# Patient Record
Sex: FEMALE | Race: WHITE | NOT HISPANIC OR LATINO | Employment: OTHER | URBAN - NONMETROPOLITAN AREA
[De-identification: names, ages, dates, MRNs, and addresses within clinical notes are randomized per-mention and may not be internally consistent; named-entity substitution may affect disease eponyms.]

---

## 2018-08-02 ENCOUNTER — OFFICE VISIT (OUTPATIENT)
Dept: NEUROSURGERY | Facility: CLINIC | Age: 83
End: 2018-08-02

## 2018-08-02 VITALS
SYSTOLIC BLOOD PRESSURE: 162 MMHG | HEIGHT: 64 IN | BODY MASS INDEX: 33.87 KG/M2 | WEIGHT: 198.4 LBS | DIASTOLIC BLOOD PRESSURE: 84 MMHG

## 2018-08-02 DIAGNOSIS — Z78.9 NON-SMOKER: ICD-10-CM

## 2018-08-02 DIAGNOSIS — M48.061 SPINAL STENOSIS, LUMBAR REGION, WITHOUT NEUROGENIC CLAUDICATION: Primary | ICD-10-CM

## 2018-08-02 PROCEDURE — 99203 OFFICE O/P NEW LOW 30 MIN: CPT | Performed by: NURSE PRACTITIONER

## 2018-08-02 RX ORDER — NICOTINE POLACRILEX 4 MG/1
1 GUM, CHEWING ORAL
COMMUNITY
End: 2021-05-03

## 2018-08-02 RX ORDER — ZINC GLUCONATE 50 MG
50 TABLET ORAL
COMMUNITY

## 2018-08-02 RX ORDER — AMLODIPINE BESYLATE 5 MG/1
5 TABLET ORAL
COMMUNITY
Start: 2017-05-08

## 2018-08-02 RX ORDER — DOCUSATE SODIUM 100 MG/1
100 CAPSULE, LIQUID FILLED ORAL
COMMUNITY

## 2018-08-02 RX ORDER — LOSARTAN POTASSIUM 25 MG/1
25 TABLET ORAL
COMMUNITY

## 2018-08-02 NOTE — PROGRESS NOTES
Chief complaint:   Chief Complaint   Patient presents with   • Back Pain     Yaneth is referred here today for her back, this pain goes down her right leg.  She brings with her today an MRI of the lumbar from [facility].  She tried physical therapy, had to discontinue it yesterday, she couldn't handle it.        Subjective     HPI: This is an 83-year-old female patient who was referred to us by Dr. Dov Saunders for back and leg pain.  She is here to be evaluated today.  Patient had surgery in her back which consisted of a laminectomy and L4 through S1 posterior pedicle screw and she mentation for spondylolisthesis and stenosis.  She says that she did do well after that surgery.  About 6 months ago she did sustain a fall and says that since then she is been noticing some increased pain but 6 weeks ago did get significantly worse.  She states that the pain in her back is intermittent.  Its worse with certain positioning and better with certain positioning.  She has pain that radiates down into her right lower extremity in a lateral radicular fashion down to about her knee.  This pain is intermittent as well.  Again its worse with certain positioning and worse with certain positioning.  Denies any bowel or bladder incontinence.  She has tried physical therapy without any meaningful relief.  She has not any recent chiropractic care pain management injections.  She rates the pain on scale 0-10 out of 10 when it does act up she says it can interfere with activities of daily living.  She is right-hand dominant.  She is retired.  She is .    Review of Systems   HENT: Positive for hearing loss and tinnitus.    Respiratory: Positive for apnea.    Cardiovascular: Positive for leg swelling.   Gastrointestinal: Positive for abdominal distention and constipation.   Endocrine: Positive for polyuria.   Genitourinary: Positive for urgency.   Musculoskeletal: Positive for arthralgias, back pain, gait problem,  "joint swelling, neck pain and neck stiffness.   Neurological: Positive for light-headedness, numbness and headaches.   Psychiatric/Behavioral: Positive for decreased concentration.   All other systems reviewed and are negative.       Past Medical History:   Diagnosis Date   • Arthritis    • Diabetes (CMS/HCC)    • Heart trouble    • Hypertension    • Stroke (CMS/HCC)     Mini stroke     Past Surgical History:   Procedure Laterality Date   • ANKLE SURGERY      fixation of left ankle   • BACK SURGERY     • EYE SURGERY      Lasar surgery both eyes   • HYSTERECTOMY     • REPLACEMENT TOTAL KNEE BILATERAL     • ROTATOR CUFF REPAIR Right      Family History   Problem Relation Age of Onset   • Heart disease Mother    • Parkinsonism Father    • Cancer Sister      Social History   Substance Use Topics   • Smoking status: Never Smoker   • Smokeless tobacco: Never Used   • Alcohol use No       (Not in a hospital admission)  Allergies:  Oxycodone hcl; Bacitracin; Neomycin; Penicillin g; and Polymyxin b    Objective      Vital Signs  /84 (BP Location: Right arm, Patient Position: Sitting)   Ht 162.6 cm (64\")   Wt 90 kg (198 lb 6.4 oz)   BMI 34.06 kg/m²     Physical Exam   Constitutional: She is oriented to person, place, and time. She appears well-developed and well-nourished.   HENT:   Head: Normocephalic.   Eyes: Pupils are equal, round, and reactive to light. Conjunctivae, EOM and lids are normal.   Neck: Normal range of motion.   Cardiovascular: Normal rate, regular rhythm and normal heart sounds.    Pulmonary/Chest: Effort normal and breath sounds normal.   Abdominal: Normal appearance.   Musculoskeletal: Normal range of motion.        Lumbar back: She exhibits pain.   Neurological: She is alert and oriented to person, place, and time. She has normal strength. She displays normal reflexes. No cranial nerve deficit or sensory deficit. GCS eye subscore is 4. GCS verbal subscore is 5. GCS motor subscore is 6.   Reflex " Scores:       Patellar reflexes are 3+ on the right side and 3+ on the left side.  Skin: Skin is warm.   Psychiatric: She has a normal mood and affect. Her speech is normal and behavior is normal. Thought content normal. Cognition and memory are normal.       Results Review: MRI of the lumbar spine shows the patient has had a posterior pedicle screw instrumentation at L4-5 with laminectomy.  She does have adjacent level degeneration present at L2-3 and L3-for is causing central and bilateral neural foraminal narrowing.  No fracture visualized.          Assessment/Plan: At this point we are going to recommend that the patient do a trial of pain management injections to see this will be of a benefit to her.  Dr. Montes did review the imaging and felt the patient would require an extension of her previous fusion at L2 and given the patient's age and the amount of surgery that it requires he felt that it would be better if she can try manages with injections.  The patient is agreeable to this.  She is getting go for second opinion back to her original surgeon in [place].  At this point she only was to be seen on an as-needed basis and told to call us if she had any further problems.  BMI shows that she is overweight.  BMI chart was given the patient.  She is a nonsmoker.       Starr was seen today for back pain.    Diagnoses and all orders for this visit:    Spinal stenosis, lumbar region, without neurogenic claudication    BMI 34.0-34.9,adult    Non-smoker          I discussed the patients findings and my recommendations with patient    Benjamin Feng, ROMEL  08/02/18  9:50 AM

## 2018-08-02 NOTE — PATIENT INSTRUCTIONS

## 2018-08-20 ENCOUNTER — TELEPHONE (OUTPATIENT)
Dept: NEUROSURGERY | Facility: CLINIC | Age: 83
End: 2018-08-20

## 2018-08-20 DIAGNOSIS — M48.061 SPINAL STENOSIS OF LUMBAR REGION, UNSPECIFIED WHETHER NEUROGENIC CLAUDICATION PRESENT: Primary | ICD-10-CM

## 2018-08-20 NOTE — TELEPHONE ENCOUNTER
Starr has called the office after a visit on 08/02/18, at that time she had decided to go back to her surgeon in [facility] for a 2nd opinion, after she saw him she states he was in agreement with Dr. Montes so she has decided to have all here treatment here in [place].  She was suppose to see a pain management in [facility] but they cannot see her for 2 months.     Starr is asking to treat here, she would like a referral to a local pain management and a office visit with Dr. Montes as soon as can be arranged, she states she is in so much pain and needs relief as soon as possible.    Please call Starr to advise.  Her # [phone]

## 2018-08-20 NOTE — TELEPHONE ENCOUNTER
She can be referred to Dr. echols for pain management and then give her an appointment with Dr. Montes for 3 months.  Thanks

## 2018-08-21 NOTE — TELEPHONE ENCOUNTER
I called patient back to let her know we will make a referral for her to Dr. Moreno for pain management and I also gave her an appointment with Dr. Montes for 11/01/18 for follow up.

## 2018-11-01 ENCOUNTER — OFFICE VISIT (OUTPATIENT)
Dept: NEUROSURGERY | Facility: CLINIC | Age: 83
End: 2018-11-01

## 2018-11-01 VITALS
BODY MASS INDEX: 35.34 KG/M2 | DIASTOLIC BLOOD PRESSURE: 74 MMHG | HEIGHT: 64 IN | WEIGHT: 207 LBS | SYSTOLIC BLOOD PRESSURE: 168 MMHG

## 2018-11-01 DIAGNOSIS — M48.061 SPINAL STENOSIS, LUMBAR REGION, WITHOUT NEUROGENIC CLAUDICATION: Primary | ICD-10-CM

## 2018-11-01 DIAGNOSIS — Z78.9 NON-SMOKER: ICD-10-CM

## 2018-11-01 PROCEDURE — 99213 OFFICE O/P EST LOW 20 MIN: CPT | Performed by: NEUROLOGICAL SURGERY

## 2018-11-01 RX ORDER — TIZANIDINE 4 MG/1
TABLET ORAL
COMMUNITY
Start: 2018-08-30

## 2018-11-01 RX ORDER — GABAPENTIN 100 MG/1
CAPSULE ORAL
COMMUNITY
Start: 2018-08-16 | End: 2021-05-03

## 2018-11-01 RX ORDER — ROSUVASTATIN CALCIUM 10 MG/1
TABLET, COATED ORAL
COMMUNITY
Start: 2018-10-03

## 2018-11-01 NOTE — PROGRESS NOTES
SUBJECTIVE:  Patient ID: Starr Chris is a 83 y.o. female is here today for follow-up.    Chief Complaint: Back pain  Chief Complaint   Patient presents with   • back & leg pain     patient is here for a 3 month follow up after pain mgmt; she went to see a physician in [facility] ([facility] physicians)       HPI  83-year-old female we have seen in the past for back pain and some right lower extremity radicular type pain.  She has 2 level adjacent level stenosis at L2-3 34 above a previous fusion.  We sent her for a dedicated course of pain management injections.  She has been working with [facility] rehabilitation.  She has had 2 injections.  The first one did help to some degree.  The second one was a week ago and seems to have further improved her pain control.    The following portions of the patient's history were reviewed and updated as appropriate: allergies, current medications, past family history, past medical history, past social history, past surgical history and problem list.    OBJECTIVE:    Review of Systems   Musculoskeletal: Positive for back pain.   All other systems reviewed and are negative.         Physical Exam   Constitutional: She is oriented to person, place, and time. She appears well-developed and well-nourished.   HENT:   Head: Normocephalic and atraumatic.   Right Ear: Hearing normal.   Left Ear: Hearing normal.   Eyes: Pupils are equal, round, and reactive to light. EOM are normal.   Neck: Normal range of motion.   Neurological: She is alert and oriented to person, place, and time. She has normal strength and normal reflexes. No cranial nerve deficit or sensory deficit. She displays a negative Romberg sign. GCS eye subscore is 4. GCS verbal subscore is 5. GCS motor subscore is 6. She displays no Babinski's sign on the right side. She displays no Babinski's sign on the left side.   Psychiatric: Her speech is normal. Judgment normal. Cognition and memory are normal.       Neurologic Exam      Mental Status   Oriented to person, place, and time.   Speech: speech is normal     Cranial Nerves     CN III, IV, VI   Pupils are equal, round, and reactive to light.  Extraocular motions are normal.     Motor Exam     Strength   Strength 5/5 throughout.       Independent Review of Radiographic Studies:   Severe concentric lumbar stenosis at L2-3 and 3-4 above a previous 2 level fusion.    ASSESSMENT/PLAN:  Miss Chris is doing well with conservative care right now.  She is just beginning of injection therapy.  I explained to her and her daughter that we would have to exhaust all conservative measures before we consider extending her fusion 2 levels at her age.  She apparently has had a second opinion and they agree with our assessment.  I be happy to see her in the future should the injections no longer bring her relief.      1. Spinal stenosis, lumbar region, without neurogenic claudication    2. Non-smoker    3. BMI 35.0-35.9,adult            Return if symptoms worsen or fail to improve.      Baltazar Montes MD

## 2018-11-01 NOTE — PATIENT INSTRUCTIONS
PATIENT TO CONTINUE TO FOLLOW UP WITH HER PRIMARY CARE PROVIDER FOR YEARLY PHYSICAL EXAMS TO ENSURE COMPLETE HEALTH MAINTENANCE      BMI for Adults  Body mass index (BMI) is a number that is calculated from a person's weight and height. In most adults, the number is used to find how much of an adult's weight is made up of fat. BMI is not as accurate as a direct measure of body fat.  How is BMI calculated?  BMI is calculated by dividing weight in kilograms by height in meters squared. It can also be calculated by dividing weight in pounds by height in inches squared, then multiplying the resulting number by 703. Charts are available to help you find your BMI quickly and easily without doing this calculation.  How is BMI interpreted?  Health care professionals use BMI charts to identify whether an adult is underweight, at a normal weight, or overweight based on the following guidelines:  · Underweight: BMI less than 18.5.  · Normal weight: BMI between 18.5 and 24.9.  · Overweight: BMI between 25 and 29.9.  · Obese: BMI of 30 and above.    BMI is usually interpreted the same for males and females.  Weight includes both fat and muscle, so someone with a muscular build, such as an athlete, may have a BMI that is higher than 24.9. In cases like these, BMI may not accurately depict body fat. To determine if excess body fat is the cause of a BMI of 25 or higher, further assessments may need to be done by a health care provider.  Why is BMI a useful tool?  BMI is used to identify a possible weight problem that may be related to a medical problem or may increase the risk for medical problems. BMI can also be used to promote changes to reach a healthy weight.  This information is not intended to replace advice given to you by your health care provider. Make sure you discuss any questions you have with your health care provider.  Document Released: 08/29/2005 Document Revised: 04/27/2017 Document Reviewed: 05/15/2015  Janelle  Interactive Patient Education © 2018 [employer]

## 2021-04-29 ENCOUNTER — TELEPHONE (OUTPATIENT)
Dept: NEUROSURGERY | Facility: CLINIC | Age: 86
End: 2021-04-29

## 2021-04-29 NOTE — TELEPHONE ENCOUNTER
Called to confirm apt for 05/03/21 @ 10:15 w/julius limon;  Left v/m with apt info & call back # to confirm

## 2021-05-03 ENCOUNTER — OFFICE VISIT (OUTPATIENT)
Dept: NEUROSURGERY | Facility: CLINIC | Age: 86
End: 2021-05-03

## 2021-05-03 VITALS
WEIGHT: 199.4 LBS | SYSTOLIC BLOOD PRESSURE: 134 MMHG | HEIGHT: 65 IN | BODY MASS INDEX: 33.22 KG/M2 | DIASTOLIC BLOOD PRESSURE: 78 MMHG

## 2021-05-03 DIAGNOSIS — Z78.9 NON-SMOKER: ICD-10-CM

## 2021-05-03 DIAGNOSIS — M48.061 SPINAL STENOSIS, LUMBAR REGION, WITHOUT NEUROGENIC CLAUDICATION: Primary | ICD-10-CM

## 2021-05-03 DIAGNOSIS — E66.1 CLASS 1 DRUG-INDUCED OBESITY WITHOUT SERIOUS COMORBIDITY WITH BODY MASS INDEX (BMI) OF 33.0 TO 33.9 IN ADULT: ICD-10-CM

## 2021-05-03 PROCEDURE — 99213 OFFICE O/P EST LOW 20 MIN: CPT | Performed by: NURSE PRACTITIONER

## 2021-05-03 RX ORDER — PANTOPRAZOLE SODIUM 20 MG/1
20 TABLET, DELAYED RELEASE ORAL DAILY
COMMUNITY

## 2021-05-03 NOTE — PROGRESS NOTES
Chief complaint:   Chief Complaint   Patient presents with   • Back Pain     Yaneth is returning to us with a CT scan of her lumbar for follow up for her low back pain, she bring the disc from [facility].  She saw Dr. Jyoti blevins in 2018 for back pain.  She is not presently in pain management but had done this in the past, she has not had any recent physical therapy.        Subjective     HPI: This is an 85-year-old female patient who was seen in the past for back pain and right lower extremity pain.  Patient has had a previous fusion from L4-S1.  The last time that we saw her she was was in November 2018 and she was working with pain management and was doing well from the injections.  She said that last year she was down quite a bit because she had shingles and is noticed over the last year that her back pain and right leg pain has started to bother her more.  She said the pain in her back is intermittent.  Is worse with standing and better with sitting or laying down.  She has pain that radiates into her right lower extremity in an anterior radicular fashion to her knee.  This pain is also intermittent has the same modifying factors as her back.  Denies any bowel or bladder incontinence.  She is not done any recent physical therapy or chiropractic care.  She is right-hand dominant.  She is retired.  Denies any tobacco, alcohol, or illicit drug use.  She states that she was working with a pain management physician in [facility] but that he has since moved.  She last saw him in January and has been to [place] with a different pain management provider but has not had any improvement with his injections.  She said that she was getting about 3 to 6 months of improvement with the injections.  Rates her pain on a scale 0-10 at a 7.  She says it does interfere with actives of daily living    Review of Systems   Constitutional: Positive for activity change.   Musculoskeletal: Positive for back pain.   All  "other systems reviewed and are negative.       Past Medical History:   Diagnosis Date   • Arthritis    • Diabetes (CMS/HCC)    • Heart trouble    • Hypertension    • Stroke (CMS/HCC)     Mini stroke     Past Surgical History:   Procedure Laterality Date   • ANKLE SURGERY      fixation of left ankle   • BACK SURGERY     • EYE SURGERY      Lasar surgery both eyes   • HYSTERECTOMY     • REPLACEMENT TOTAL KNEE BILATERAL     • ROTATOR CUFF REPAIR Right      Family History   Problem Relation Age of Onset   • Heart disease Mother    • Parkinsonism Father    • Cancer Sister      Social History     Tobacco Use   • Smoking status: Never Smoker   • Smokeless tobacco: Never Used   Substance Use Topics   • Alcohol use: No   • Drug use: No     (Not in a hospital admission)    Allergies:  Oxycodone hcl, Bacitracin, Neomycin, Penicillin g, and Polymyxin b    Objective      Vital Signs  /78   Ht 165.1 cm (65\")   Wt 90.4 kg (199 lb 6.4 oz)   BMI 33.18 kg/m²     Physical Exam  Constitutional:       Appearance: Normal appearance. She is well-developed.   HENT:      Head: Normocephalic.   Eyes:      General: Lids are normal.      Conjunctiva/sclera: Conjunctivae normal.      Pupils: Pupils are equal, round, and reactive to light.   Cardiovascular:      Rate and Rhythm: Normal rate and regular rhythm.   Pulmonary:      Effort: Pulmonary effort is normal.      Breath sounds: Normal breath sounds.   Musculoskeletal:         General: Normal range of motion.      Cervical back: Normal range of motion.      Comments: Back pain   Skin:     General: Skin is warm.   Neurological:      Mental Status: She is alert and oriented to person, place, and time.      GCS: GCS eye subscore is 4. GCS verbal subscore is 5. GCS motor subscore is 6.      Cranial Nerves: No cranial nerve deficit.      Sensory: No sensory deficit.      Deep Tendon Reflexes: Reflexes are normal and symmetric. Reflexes normal.   Psychiatric:         Speech: Speech normal. "         Behavior: Behavior normal.         Thought Content: Thought content normal.         Neurologic Exam     Mental Status   Oriented to person, place, and time.   Speech: speech is normal     Cranial Nerves     CN III, IV, VI   Pupils are equal, round, and reactive to light.      Results Review: CT scan of the lumbar spine that was done at [facility] on April 30, 2021 shows the patient is at a previous fusion from L4-S1.  Degeneration is noted at L2-3 and L3-4.  No significant stenosis that I can appreciate on the CAT scan.  No compression deformity.        Assessment/Plan: The patient would like to try different pain management doctor that is closer to her than [place].  We will make referral to Dr. AZEEM Malhotra to see if he can make any improvement with her pain with the injections.  She says that she is not taking any pain medication at this time  For first line conservative care of lumbar pain, I would like to send Ms. Chris for a dedicated course of physician directed physical therapy consisting of 2-3 times a week for 4-6 weeks.    Return for reassessment with me after 4 months after physical therapy.     Should Ms. Chris not have any improvement from physical therapy, I think it would be prudent to send the patient for an MRI of the lumbar spine to see if there is anything from a surgical standpoint that needs to be addressed.     I advised the patient to call and return sooner for new or worsening complaints of weakness, paresthesias, gait disturbances, or any additional concerns.  Treatment options discussed in detail with Starr and the patient voiced understanding.  Ms. Chris agrees with this plan of care.     Patient is a nonsmoker  The patient's Body mass index is 33.18 kg/m².. BMI is above normal parameters. Recommendations include: educational material and nutrition counseling  Advance Care Planning   ACP discussion was held with the patient during this visit. Patient does not have an advance  directive, information provided.      Diagnoses and all orders for this visit:    1. Spinal stenosis, lumbar region, without neurogenic claudication (Primary)  -     Ambulatory Referral to Physical Therapy Evaluate and treat, Neuro; Strengthening, Stretching; Full weight bearing  -     Ambulatory Referral to Pain Management    2. Class 1 drug-induced obesity without serious comorbidity with body mass index (BMI) of 33.0 to 33.9 in adult    3. Non-smoker          I discussed the patients findings and my recommendations with patient    Benjamin Feng, ROMEL  05/03/21  11:05 CDT

## 2021-05-03 NOTE — PATIENT INSTRUCTIONS

## 2021-09-09 ENCOUNTER — TELEPHONE (OUTPATIENT)
Dept: NEUROSURGERY | Facility: CLINIC | Age: 86
End: 2021-09-09

## 2021-09-09 NOTE — TELEPHONE ENCOUNTER
Called pt in attempt to reschedule her missed appt with Benjamin Feng on 9/8/21 @ 11:30am. Patient stated she has been going to pain mgmt and is not in any pain at this time so she does not need to reschedule. No show letter has still been mailed out.